# Patient Record
Sex: FEMALE | Race: WHITE | NOT HISPANIC OR LATINO
[De-identification: names, ages, dates, MRNs, and addresses within clinical notes are randomized per-mention and may not be internally consistent; named-entity substitution may affect disease eponyms.]

---

## 2021-07-11 DIAGNOSIS — E78.5 HYPERLIPIDEMIA, UNSPECIFIED: ICD-10-CM

## 2021-07-11 DIAGNOSIS — I10 ESSENTIAL (PRIMARY) HYPERTENSION: ICD-10-CM

## 2021-07-11 DIAGNOSIS — Z80.3 FAMILY HISTORY OF MALIGNANT NEOPLASM OF BREAST: ICD-10-CM

## 2021-07-11 DIAGNOSIS — Z78.9 OTHER SPECIFIED HEALTH STATUS: ICD-10-CM

## 2021-07-11 DIAGNOSIS — Z86.73 PERSONAL HISTORY OF TRANSIENT ISCHEMIC ATTACK (TIA), AND CEREBRAL INFARCTION W/OUT RESIDUAL DEFICITS: ICD-10-CM

## 2021-07-12 ENCOUNTER — APPOINTMENT (OUTPATIENT)
Dept: NEUROLOGY | Facility: CLINIC | Age: 86
End: 2021-07-12
Payer: MEDICARE

## 2021-07-12 VITALS
WEIGHT: 135 LBS | DIASTOLIC BLOOD PRESSURE: 68 MMHG | HEIGHT: 58 IN | HEART RATE: 78 BPM | BODY MASS INDEX: 28.34 KG/M2 | SYSTOLIC BLOOD PRESSURE: 139 MMHG

## 2021-07-12 DIAGNOSIS — K52.9 NONINFECTIVE GASTROENTERITIS AND COLITIS, UNSPECIFIED: ICD-10-CM

## 2021-07-12 PROCEDURE — 99213 OFFICE O/P EST LOW 20 MIN: CPT

## 2021-07-12 NOTE — CONSULT LETTER
[Dear  ___] : Dear  [unfilled], [Consult Letter:] : I had the pleasure of evaluating your patient, [unfilled]. [Please see my note below.] : Please see my note below. [Consult Closing:] : Thank you very much for allowing me to participate in the care of this patient.  If you have any questions, please do not hesitate to contact me. [Sincerely,] : Sincerely, [FreeTextEntry3] : Jc Fong MD\par  [DrJp  ___] : Dr. NIETO

## 2021-07-12 NOTE — PHYSICAL EXAM
[FreeTextEntry1] : Constitutional:  Patient was elderly and frail but in no acute distress. \par \par Head:  Normocephalic, atraumatic. Tympanic membranes were not visualized. \par \par Neck:  Supple with full range of motion.  There was mild upper thoracic tenderness.\par \par Cardiovascular:  Cardiac rhythm was regular without murmur. There were no carotid bruits.  There was bilateral pretibial and ankle edema with absent pulses at the knees and ankles.  There was acrocyanosis of the feet left more than right.  There were plantar sores on the first metatarsal phalangeal joints.\par \par Respiratory:  Lungs were clear. \par \par Abdomen:  Soft and nontender. \par \par Spine: There was mild lumbar tenderness.\par \par Skin:  There were no rashes. \par \par NEUROLOGICAL EXAMINATION:\par \par Mental Status: Patient was alert and oriented. Speech was fluent. There was no dysarthria. \par \par Cranial Nerves: \par \par II: She could finger count bilaterally.  Pupils were surgical but reactive. Visual fields were full. Funduscopic examination was normal. \par \par III, IV, VI:  Eye movements were full without nystagmus. \par \par V: Facial sensation was intact. \par \par VII: Facial strength was normal. \par \par VIII: Hearing was diminished bilaterally. \par \par IX, X: Palatal movement was normal. Phonation was normal. \par \par XI: Sternocleidomastoids and trapezii were normal. \par \par XII: Tongue was midline and movements normal. There was no lingual atrophy or fasciculations. \par \par Motor Examination: Muscle bulk, tone and strength were normal except for difficulty abducting her toes. \par \par Sensory Examination: Vibration sense was diminished in the feet.  Joint position sense was intact.  There was shading of pinprick to the knees and palms.\par \par Reflexes: DTRs were trace at the biceps and left ankle, 2 at the knees and absent elsewhere.\par \par Plantar Responses: Plantar responses were flexor. \par \par Coordination/Cerebellar Function: There was no dysmetria on finger to nose or heel to shin testing. \par \par Gait/Stance: Posture was stooped.  Strides were short and gait very unsteady.\par

## 2021-07-12 NOTE — HISTORY OF PRESENT ILLNESS
[FreeTextEntry1] : Mrs. Felicitas Ny returned to the office having been last seen on May 26, 2020 by telemedicine.  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  She was undergoing physical therapy which at least temporarily alleviated her low back pain.  She complains of painful feet, toes and soles.  Her legs were swollen.  She is on gabapentin 300 mg 3 times daily and duloxetine 60 mg daily.\par \par Mrs. Ny complains of recent fatigue.  She did not contract COVID-19 but received the Pfizer vaccine.  She complains of mild neck discomfort and more severe low back pain.  She is still plagued with daily diarrhea.  She was previously evaluated by Dr. Daron Hernandez and possibly treated with cholestyramine.  She complains of an overactive bladder with incontinence.  She has painful feet with sores under the balls of her feet.  She complains of finger cramping and difficulty knitting.\par \par Medications include gabapentin 300 mg twice a day, losartan HCT, rosuvastatin, duloxetine 60 mg daily, aspirin, vitamin C, magnesium and furosemide.

## 2021-07-12 NOTE — REVIEW OF SYSTEMS
[Feeling Tired] : feeling tired [Hand Weakness] :  hand weakness [Abnormal Sensation] : an abnormal sensation [Difficulty Walking] : difficulty walking [Incontinence] : incontinence [Negative] : Heme/Lymph

## 2021-07-12 NOTE — ASSESSMENT
[FreeTextEntry1] : Mrs. 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  She presents with worsening fatigue, low back pain and bilateral lower extremity claudication.  Although possibly on the basis of lumbar canal stenosis, the findings of plantar sores, acrocyanosis and absent pedal and popliteal pulses should prompt exclusion of vascular claudication.\par \par I suggested that she undergo PVR studies of the lower extremities.  She will return to the office for EMG and nerve conduction studies.  She will consult with you and Dr. Hernandez regarding management of her chronic diarrhea.  Further management will depend upon these results and her clinical course.

## 2021-07-20 ENCOUNTER — NON-APPOINTMENT (OUTPATIENT)
Age: 86
End: 2021-07-20

## 2021-08-15 ENCOUNTER — INPATIENT (INPATIENT)
Facility: HOSPITAL | Age: 86
LOS: 2 days | Discharge: NURSING, REHAB OR LONG-TERM CARE | DRG: 87 | End: 2021-08-18
Attending: INTERNAL MEDICINE | Admitting: INTERNAL MEDICINE
Payer: MEDICARE

## 2021-08-15 VITALS
WEIGHT: 160.06 LBS | HEIGHT: 58 IN | DIASTOLIC BLOOD PRESSURE: 89 MMHG | TEMPERATURE: 98 F | HEART RATE: 96 BPM | SYSTOLIC BLOOD PRESSURE: 170 MMHG | RESPIRATION RATE: 17 BRPM

## 2021-08-15 DIAGNOSIS — S06.5X9A TRAUMATIC SUBDURAL HEMORRHAGE WITH LOSS OF CONSCIOUSNESS OF UNSPECIFIED DURATION, INITIAL ENCOUNTER: ICD-10-CM

## 2021-08-15 DIAGNOSIS — Z96.652 PRESENCE OF LEFT ARTIFICIAL KNEE JOINT: Chronic | ICD-10-CM

## 2021-08-15 LAB
ALBUMIN SERPL ELPH-MCNC: 3.9 G/DL — SIGNIFICANT CHANGE UP (ref 3.3–5)
ALP SERPL-CCNC: 82 U/L — SIGNIFICANT CHANGE UP (ref 40–120)
ALT FLD-CCNC: 30 U/L — SIGNIFICANT CHANGE UP (ref 10–45)
ANION GAP SERPL CALC-SCNC: 10 MMOL/L — SIGNIFICANT CHANGE UP (ref 5–17)
ANION GAP SERPL CALC-SCNC: 10 MMOL/L — SIGNIFICANT CHANGE UP (ref 5–17)
AST SERPL-CCNC: 43 U/L — HIGH (ref 10–40)
BASOPHILS # BLD AUTO: 0.03 K/UL — SIGNIFICANT CHANGE UP (ref 0–0.2)
BASOPHILS NFR BLD AUTO: 0.3 % — SIGNIFICANT CHANGE UP (ref 0–2)
BILIRUB SERPL-MCNC: 0.4 MG/DL — SIGNIFICANT CHANGE UP (ref 0.2–1.2)
BLD GP AB SCN SERPL QL: NEGATIVE — SIGNIFICANT CHANGE UP
BUN SERPL-MCNC: 18 MG/DL — SIGNIFICANT CHANGE UP (ref 7–23)
BUN SERPL-MCNC: 20 MG/DL — SIGNIFICANT CHANGE UP (ref 7–23)
CALCIUM SERPL-MCNC: 9.2 MG/DL — SIGNIFICANT CHANGE UP (ref 8.4–10.5)
CALCIUM SERPL-MCNC: 9.6 MG/DL — SIGNIFICANT CHANGE UP (ref 8.4–10.5)
CHLORIDE SERPL-SCNC: 103 MMOL/L — SIGNIFICANT CHANGE UP (ref 96–108)
CHLORIDE SERPL-SCNC: 104 MMOL/L — SIGNIFICANT CHANGE UP (ref 96–108)
CO2 SERPL-SCNC: 25 MMOL/L — SIGNIFICANT CHANGE UP (ref 22–31)
CO2 SERPL-SCNC: 28 MMOL/L — SIGNIFICANT CHANGE UP (ref 22–31)
CREAT SERPL-MCNC: 0.86 MG/DL — SIGNIFICANT CHANGE UP (ref 0.5–1.3)
CREAT SERPL-MCNC: 0.89 MG/DL — SIGNIFICANT CHANGE UP (ref 0.5–1.3)
EOSINOPHIL # BLD AUTO: 0.07 K/UL — SIGNIFICANT CHANGE UP (ref 0–0.5)
EOSINOPHIL NFR BLD AUTO: 0.7 % — SIGNIFICANT CHANGE UP (ref 0–6)
GLUCOSE SERPL-MCNC: 114 MG/DL — HIGH (ref 70–99)
GLUCOSE SERPL-MCNC: 123 MG/DL — HIGH (ref 70–99)
HCT VFR BLD CALC: 49 % — HIGH (ref 34.5–45)
HGB BLD-MCNC: 15.8 G/DL — HIGH (ref 11.5–15.5)
IMM GRANULOCYTES NFR BLD AUTO: 0.4 % — SIGNIFICANT CHANGE UP (ref 0–1.5)
LYMPHOCYTES # BLD AUTO: 0.64 K/UL — LOW (ref 1–3.3)
LYMPHOCYTES # BLD AUTO: 6.7 % — LOW (ref 13–44)
MCHC RBC-ENTMCNC: 32.2 GM/DL — SIGNIFICANT CHANGE UP (ref 32–36)
MCHC RBC-ENTMCNC: 32.6 PG — SIGNIFICANT CHANGE UP (ref 27–34)
MCV RBC AUTO: 101 FL — HIGH (ref 80–100)
MONOCYTES # BLD AUTO: 0.55 K/UL — SIGNIFICANT CHANGE UP (ref 0–0.9)
MONOCYTES NFR BLD AUTO: 5.7 % — SIGNIFICANT CHANGE UP (ref 2–14)
NEUTROPHILS # BLD AUTO: 8.26 K/UL — HIGH (ref 1.8–7.4)
NEUTROPHILS NFR BLD AUTO: 86.2 % — HIGH (ref 43–77)
NRBC # BLD: 0 /100 WBCS — SIGNIFICANT CHANGE UP (ref 0–0)
PLATELET # BLD AUTO: 188 K/UL — SIGNIFICANT CHANGE UP (ref 150–400)
POTASSIUM SERPL-MCNC: 4.1 MMOL/L — SIGNIFICANT CHANGE UP (ref 3.5–5.3)
POTASSIUM SERPL-MCNC: 5.9 MMOL/L — HIGH (ref 3.5–5.3)
POTASSIUM SERPL-SCNC: 4.1 MMOL/L — SIGNIFICANT CHANGE UP (ref 3.5–5.3)
POTASSIUM SERPL-SCNC: 5.9 MMOL/L — HIGH (ref 3.5–5.3)
PROT SERPL-MCNC: 7.1 G/DL — SIGNIFICANT CHANGE UP (ref 6–8.3)
RBC # BLD: 4.85 M/UL — SIGNIFICANT CHANGE UP (ref 3.8–5.2)
RBC # FLD: 12.6 % — SIGNIFICANT CHANGE UP (ref 10.3–14.5)
RH IG SCN BLD-IMP: POSITIVE — SIGNIFICANT CHANGE UP
SARS-COV-2 RNA SPEC QL NAA+PROBE: SIGNIFICANT CHANGE UP
SODIUM SERPL-SCNC: 138 MMOL/L — SIGNIFICANT CHANGE UP (ref 135–145)
SODIUM SERPL-SCNC: 142 MMOL/L — SIGNIFICANT CHANGE UP (ref 135–145)
WBC # BLD: 9.59 K/UL — SIGNIFICANT CHANGE UP (ref 3.8–10.5)
WBC # FLD AUTO: 9.59 K/UL — SIGNIFICANT CHANGE UP (ref 3.8–10.5)

## 2021-08-15 PROCEDURE — 71045 X-RAY EXAM CHEST 1 VIEW: CPT | Mod: 26

## 2021-08-15 PROCEDURE — 72170 X-RAY EXAM OF PELVIS: CPT | Mod: 26

## 2021-08-15 PROCEDURE — 70450 CT HEAD/BRAIN W/O DYE: CPT | Mod: 26,MH

## 2021-08-15 PROCEDURE — 72125 CT NECK SPINE W/O DYE: CPT | Mod: 26,MH

## 2021-08-15 PROCEDURE — G1004: CPT

## 2021-08-15 PROCEDURE — 99285 EMERGENCY DEPT VISIT HI MDM: CPT

## 2021-08-15 PROCEDURE — 70450 CT HEAD/BRAIN W/O DYE: CPT | Mod: 26,MG,77

## 2021-08-15 RX ORDER — LEVETIRACETAM 250 MG/1
500 TABLET, FILM COATED ORAL ONCE
Refills: 0 | Status: COMPLETED | OUTPATIENT
Start: 2021-08-15 | End: 2021-08-15

## 2021-08-15 RX ORDER — MIRABEGRON 50 MG/1
1 TABLET, EXTENDED RELEASE ORAL
Qty: 0 | Refills: 0 | DISCHARGE

## 2021-08-15 RX ORDER — SODIUM CHLORIDE 9 MG/ML
1000 INJECTION, SOLUTION INTRAVENOUS
Refills: 0 | Status: DISCONTINUED | OUTPATIENT
Start: 2021-08-15 | End: 2021-08-18

## 2021-08-15 RX ORDER — LOSARTAN POTASSIUM 100 MG/1
1 TABLET, FILM COATED ORAL
Qty: 0 | Refills: 0 | DISCHARGE

## 2021-08-15 RX ORDER — THIAMINE MONONITRATE (VIT B1) 100 MG
1 TABLET ORAL
Qty: 0 | Refills: 0 | DISCHARGE

## 2021-08-15 RX ORDER — ASPIRIN/CALCIUM CARB/MAGNESIUM 324 MG
1 TABLET ORAL
Qty: 0 | Refills: 0 | DISCHARGE

## 2021-08-15 RX ORDER — GABAPENTIN 400 MG/1
1 CAPSULE ORAL
Qty: 0 | Refills: 0 | DISCHARGE

## 2021-08-15 RX ORDER — DICLOFENAC SODIUM 30 MG/G
0 GEL TOPICAL
Qty: 0 | Refills: 0 | DISCHARGE

## 2021-08-15 RX ORDER — DENOSUMAB 60 MG/ML
0 INJECTION SUBCUTANEOUS
Qty: 0 | Refills: 0 | DISCHARGE

## 2021-08-15 RX ORDER — ROSUVASTATIN CALCIUM 5 MG/1
1 TABLET ORAL
Qty: 0 | Refills: 0 | DISCHARGE

## 2021-08-15 RX ORDER — FUROSEMIDE 40 MG
1 TABLET ORAL
Qty: 0 | Refills: 0 | DISCHARGE

## 2021-08-15 RX ORDER — DULOXETINE HYDROCHLORIDE 30 MG/1
2 CAPSULE, DELAYED RELEASE ORAL
Qty: 0 | Refills: 0 | DISCHARGE

## 2021-08-15 RX ORDER — TETANUS TOXOID, REDUCED DIPHTHERIA TOXOID AND ACELLULAR PERTUSSIS VACCINE, ADSORBED 5; 2.5; 8; 8; 2.5 [IU]/.5ML; [IU]/.5ML; UG/.5ML; UG/.5ML; UG/.5ML
0.5 SUSPENSION INTRAMUSCULAR ONCE
Refills: 0 | Status: COMPLETED | OUTPATIENT
Start: 2021-08-15 | End: 2021-08-15

## 2021-08-15 RX ORDER — ACETAMINOPHEN 500 MG
650 TABLET ORAL ONCE
Refills: 0 | Status: COMPLETED | OUTPATIENT
Start: 2021-08-15 | End: 2021-08-15

## 2021-08-15 RX ADMIN — TETANUS TOXOID, REDUCED DIPHTHERIA TOXOID AND ACELLULAR PERTUSSIS VACCINE, ADSORBED 0.5 MILLILITER(S): 5; 2.5; 8; 8; 2.5 SUSPENSION INTRAMUSCULAR at 12:14

## 2021-08-15 RX ADMIN — Medication 650 MILLIGRAM(S): at 11:57

## 2021-08-15 RX ADMIN — Medication 650 MILLIGRAM(S): at 12:27

## 2021-08-15 RX ADMIN — LEVETIRACETAM 400 MILLIGRAM(S): 250 TABLET, FILM COATED ORAL at 16:34

## 2021-08-15 RX ADMIN — SODIUM CHLORIDE 50 MILLILITER(S): 9 INJECTION, SOLUTION INTRAVENOUS at 21:32

## 2021-08-15 NOTE — ED PROVIDER NOTE - NSICDXPASTMEDICALHX_GEN_ALL_CORE_FT
PAST MEDICAL HISTORY:  Arthroplasty of the Knee     CVA (Cerebral Vascular Accident)     Hypertension

## 2021-08-15 NOTE — CONSULT NOTE ADULT - ASSESSMENT
SILVERIO MCGILL  89 F hx TIA/HTN on ASA81 p/f fall. Hit back of head w/o LOC. CT: 2x1.5cm R parafalcine SDH w/o MLS. Exam: AOx3, PERRL, EOMI, no facial, no drift, LOU 5/5, SILT.  - No acute neurosurgical intervention  - Keppra 500 BID for 7 days  - Repeat CTH in 4h to r/o enlarging SDH  - Outpatient f/u after discharge  - Will discuss with neurosurgery attending  - Plan discussed with ER   SILVERIO MCGILL  89 F hx TIA/HTN on ASA81 p/f fall. Hit back of head w/o LOC. CT: 2x1.5mm R parafalcine SDH w/o MLS. Exam: AOx3, PERRL, EOMI, no facial, no drift, LOU 5/5, SILT.  - No acute neurosurgical intervention  - Keppra 500 BID for 7 days  - Repeat CTH in 4h to r/o enlarging SDH  - Outpatient f/u after discharge  - Will discuss with neurosurgery attending  - Plan discussed with ER

## 2021-08-15 NOTE — ED ADULT NURSE NOTE - OBJECTIVE STATEMENT
Pt presents to ED via EMS in [place] for fall at home, AXOX3, PERRL, pt reports falling in bathroom after losing balance, pt denies LOC, pt reports head injury, pt on ASA, no other AC use reported.  Pt was attended to immediately following the fall by , EMS was called at that time, Pt reports pain  to posterior aspect of head, tender on palpation, reports pain at midline of cervical spine on palpation. Pt denies chest pain or shortness of breath, breathing unlabored, symmetrical, abdomen soft and nontender, pt reports mild nausea, no vomiting, diarrhea, no fevers or chills. +2 edema noted bilaterally. Skin tear approx 2cm noted to R elbow, dressed on arrival.

## 2021-08-15 NOTE — ED PROVIDER NOTE - NS ED ROS FT
GENERAL: No fever or chills  EYES: no change in vision  HEENT: no trouble swallowing or speaking  CARDIAC: no chest pain or palpitations   PULMONARY: no cough or SOB  GI: no abdominal pain, vomiting, diarrhea, or constipation   : No changes in urination  SKIN: see hpi  NEURO: no headache, numbness, or weakness.  MSK: see hpi

## 2021-08-15 NOTE — H&P ADULT - ASSESSMENT
89 F with pmhx cad, htn s/op fall today.   Patient states that she walks with walker, tripped and fell, no loss of conscious ness. Head injury present.     ROS other wise negative.

## 2021-08-15 NOTE — ED PROVIDER NOTE - CLINICAL SUMMARY MEDICAL DECISION MAKING FREE TEXT BOX
89 yo F with no pmh on Aspirin coming from home after trip and fall today. VS WNL. Left 8x6cm scalp hematoma. C6-7 spine ttp. Minor skin desquamation on the left arm. Head c-spine CT, CXR, pelvis XR, Tdap, reassess. 89 yo F with no pmh on Aspirin coming from home after trip and fall today. VS WNL. Left 8x6cm scalp hematoma. C6-7 spine ttp. Minor skin desquamation on the left arm. Head c-spine CT, CXR, pelvis XR, Tdap, reassess. ZR

## 2021-08-15 NOTE — ED ADULT NURSE REASSESSMENT NOTE - NS ED NURSE REASSESS COMMENT FT1
Report received from Edgar OJEDA RN. Pt AAOx4, NAD, resp nonlabored, skin warm/dry, resting comfortably in bed with call bell at bedside. Pt denies headache, dizziness, chest pain, palpitations, SOB, abd pain, n/v/d, urinary symptoms, fevers, chills at this time. Pt awaiting bed. Safety maintained.

## 2021-08-15 NOTE — ED PROVIDER NOTE - NSICDXFAMILYHX_GEN_ALL_CORE_FT
FAMILY HISTORY:  Mother  Still living? No  Family history of coronary artery disease, Age at diagnosis:

## 2021-08-15 NOTE — CONSULT NOTE ADULT - SUBJECTIVE AND OBJECTIVE BOX
p ([id number])     HPI:  89 yo F with no pmh on Aspirin coming from home after trip and fall today. Reports tripping and falling in the restroom. Reports hitting the back of her head but no LOC. Pt called her  who helped her get up and called EMS. Also with abrasion to r arm. Reports neck pain and nausea but no vomiting, visual deficits or chest pain / sob prior to fall. No weakness.    Imaging:  Head CT: 1.9 x 1.5 cm x 0.5 cm thick right interhemisphere small subdural hemorrhage adjacent to the upper frontal lobe falx. No midline shift.  Old lacunar infarct left basal ganglia extending into the left corona radiata.    Exam:  AOx3, PERRL, EOMI, no facial, no drift, LOU 5/5, SILT.     --Anticoagulation:    =====================  PAST MEDICAL HISTORY   Hypertension    CVA (Cerebral Vascular Accident)    Arthroplasty of the Knee      PAST SURGICAL HISTORY   History of total knee arthroplasty, left      penicillin (Rash)      MEDICATIONS:  Antibiotics:    Neuro:    Other:      SOCIAL HISTORY:   Occupation:   Marital Status:     FAMILY HISTORY:  Family history of coronary artery disease (Mother)        ROS: Negative except per HPI    LABS:                          15.8   9.59  )-----------( 188      ( 15 Aug 2021 13:37 )             49.0     08-15    138  |  103  |  20  ----------------------------<  123<H>  5.9<H>   |  25  |  0.86    Ca    9.6      15 Aug 2021 13:37    TPro  7.1  /  Alb  3.9  /  TBili  0.4  /  DBili  x   /  AST  43<H>  /  ALT  30  /  AlkPhos  82  08-15       p ([id number])     HPI:  89 yo F with no pmh on Aspirin coming from home after trip and fall today. Reports tripping and falling in the restroom. Reports hitting the back of her head but no LOC. Pt called her  who helped her get up and called EMS. Also with abrasion to r arm. Reports neck pain and nausea but no vomiting, visual deficits or chest pain / sob prior to fall. No weakness.    Imaging:  Head CT: 1.9 x 1.5 mm thick right interhemisphere small subdural hemorrhage adjacent to the upper frontal lobe falx. No midline shift.  Old lacunar infarct left basal ganglia extending into the left corona radiata.    Exam:  AOx3, PERRL, EOMI, no facial, no drift, LOU 5/5, SILT.     --Anticoagulation:    =====================  PAST MEDICAL HISTORY   Hypertension    CVA (Cerebral Vascular Accident)    Arthroplasty of the Knee      PAST SURGICAL HISTORY   History of total knee arthroplasty, left      penicillin (Rash)      MEDICATIONS:  Antibiotics:    Neuro:    Other:      SOCIAL HISTORY:   Occupation:   Marital Status:     FAMILY HISTORY:  Family history of coronary artery disease (Mother)        ROS: Negative except per HPI    LABS:                          15.8   9.59  )-----------( 188      ( 15 Aug 2021 13:37 )             49.0     08-15    138  |  103  |  20  ----------------------------<  123<H>  5.9<H>   |  25  |  0.86    Ca    9.6      15 Aug 2021 13:37    TPro  7.1  /  Alb  3.9  /  TBili  0.4  /  DBili  x   /  AST  43<H>  /  ALT  30  /  AlkPhos  82  08-15

## 2021-08-15 NOTE — ED PROVIDER NOTE - OBJECTIVE STATEMENT
89 yo F with no pmh on Aspirin coming from home after trip and fall today. Reports tripping and falling in the restroom. Reports hitting the back of her head but no LOC. 89 yo F with no pmh on Aspirin coming from home after trip and fall today. Reports tripping and falling in the restroom. Reports hitting the back of her head but no LOC. Pt called her  who helped her get up and called EMS. Also with abrasion to r arm. Reports neck pain and nausea but no vomiting, visual deficits or chest pain / sob prior to fall. No weakness.

## 2021-08-15 NOTE — ED ADULT TRIAGE NOTE - CHIEF COMPLAINT QUOTE
trip in the bathroom, fell backwards, hitting back of head and R elbow. denies LOC. not on a/c. c collar in place. denies pain

## 2021-08-15 NOTE — ED PROVIDER NOTE - PHYSICAL EXAMINATION
Gen: AAOx3, non-toxic  Head: Left 8x6cm scalp hematoma. C6-7 spine ttp.   HEENT: EOMI, oral mucosa moist, normal conjunctiva  Lung: CTAB, no respiratory distress, no wheezes/rhonchi/rales B/L, speaking in full sentences  CV: RRR, no murmurs, rubs or gallops  Abd: soft, NTND, no guarding, no CVA tenderness  MSK: no visible deformities  Neuro: No focal sensory or motor deficits, normal CN exam   Skin: 2x3cm desquamation on the left lateral arm.   Psych: normal affect.

## 2021-08-15 NOTE — ED PROVIDER NOTE - PROGRESS NOTE DETAILS
CT with small Right SAH,  no midline shift. Neurosurgery consulted and aware of the patient. CT with small Right SDH, no midline shift. Neurosurgery consulted and aware of the patient. Rpt CT stable, cleared by NSG. C-spine cleared as well. Patient, however, unable to safety ambulate and only lives her 89yo .

## 2021-08-16 LAB
COVID-19 SPIKE DOMAIN AB INTERP: POSITIVE
COVID-19 SPIKE DOMAIN ANTIBODY RESULT: >250 U/ML — HIGH
SARS-COV-2 IGG+IGM SERPL QL IA: >250 U/ML — HIGH
SARS-COV-2 IGG+IGM SERPL QL IA: POSITIVE

## 2021-08-16 PROCEDURE — 70450 CT HEAD/BRAIN W/O DYE: CPT | Mod: 26

## 2021-08-16 PROCEDURE — 99221 1ST HOSP IP/OBS SF/LOW 40: CPT

## 2021-08-16 RX ORDER — ACETAMINOPHEN 500 MG
1000 TABLET ORAL ONCE
Refills: 0 | Status: COMPLETED | OUTPATIENT
Start: 2021-08-16 | End: 2021-08-16

## 2021-08-16 RX ORDER — LEVETIRACETAM 250 MG/1
500 TABLET, FILM COATED ORAL
Refills: 0 | Status: DISCONTINUED | OUTPATIENT
Start: 2021-08-16 | End: 2021-08-18

## 2021-08-16 RX ORDER — ACETAMINOPHEN 500 MG
650 TABLET ORAL EVERY 6 HOURS
Refills: 0 | Status: DISCONTINUED | OUTPATIENT
Start: 2021-08-16 | End: 2021-08-16

## 2021-08-16 RX ADMIN — Medication 400 MILLIGRAM(S): at 08:00

## 2021-08-16 RX ADMIN — LEVETIRACETAM 500 MILLIGRAM(S): 250 TABLET, FILM COATED ORAL at 05:10

## 2021-08-16 RX ADMIN — LEVETIRACETAM 500 MILLIGRAM(S): 250 TABLET, FILM COATED ORAL at 17:55

## 2021-08-16 NOTE — PATIENT PROFILE ADULT - NSASFALLNEEDSASSIST_GEN_A_NUR
Controlled.  Pt is currently stable on medication regimen.  Continue current therapy as scheduled.  Contact office with any questions about adjustments on medications.      yes

## 2021-08-17 RX ADMIN — LEVETIRACETAM 500 MILLIGRAM(S): 250 TABLET, FILM COATED ORAL at 17:04

## 2021-08-17 RX ADMIN — LEVETIRACETAM 500 MILLIGRAM(S): 250 TABLET, FILM COATED ORAL at 05:25

## 2021-08-17 NOTE — CONSULT NOTE ADULT - SUBJECTIVE AND OBJECTIVE BOX
[facility]        Patient is a 89y old  Female who presents with a chief complaint of s/p fall today (17 Aug 2021 13:03)    Excerpt from H&P,"     89 F with pmhx cad, htn s/op fall today.   Patient states that she walks with walker, tripped and fell, no loss of conscious ness. Head injury present.     ROS other wise negative.    (15 Aug 2021 19:49)           *****PAST MEDICAL / Surgical  HISTORY:  PAST MEDICAL & SURGICAL HISTORY:  Hypertension    CVA (Cerebral Vascular Accident)    Arthroplasty of the Knee    History of total knee arthroplasty, left             *****FAMILY HISTORY:  FAMILY HISTORY:  Family history of coronary artery disease (Mother)             *****SOCIAL HISTORY:  Alcohol: None  Smoking: None         *****ALLERGIES:   Allergies    penicillin (Rash)    Intolerances             *****MEDICATIONS: current medication reviewed and documented.   MEDICATIONS  (STANDING):  lactated ringers. 1000 milliLiter(s) (50 mL/Hr) IV Continuous <Continuous>  levETIRAcetam 500 milliGRAM(s) Oral two times a day    MEDICATIONS  (PRN):           *****REVIEW OF SYSTEM:  GEN: no fever, no chills, no pain  RESP: no SOB, no cough, no sputum  CVS: no chest pain, no palpitations, no edema  GI: no abdominal pain, no nausea, no vomiting, no constipation, no diarrhea  : no dysurea, no frequency, no hematurea  Neuro: no headache, no dizziness  PSYCH: no anxiety, no depression  Derm : no itching, no rash         *****VITAL SIGNS:  T(C): 36.8 (08-17-21 @ 16:02), Max: 36.8 (08-17-21 @ 16:02)  HR: 77 (08-17-21 @ 16:02) (71 - 86)  BP: 155/70 (08-17-21 @ 16:02) (126/69 - 171/70)  RR: 18 (08-17-21 @ 16:02) (17 - 18)  SpO2: 96% (08-17-21 @ 16:02) (93% - 96%)  Wt(kg): --    08-16 @ 07:01  -  08-17 @ 07:00  --------------------------------------------------------  IN: 240 mL / OUT: 600 mL / NET: -360 mL    08-17 @ 07:01  -  08-17 @ 18:14  --------------------------------------------------------  IN: 480 mL / OUT: 300 mL / NET: 180 mL             *****PHYSICAL EXAM:   Alert oriented x 3   Attention comprehension are fair. Able to name, repeat, read without any difficulty.   Able to follow 3 step commands.     EOMI fundi not visualized,  VFF to confrontration  No facial asymmetry   Tongue is midline   Palate elevates symmetrically   Moving all 4 ext symmetrically no pronator drift   Reflexes are symmetric throughout   sensation is grossly symmetric  Gait : not assessed.  B/L down going toes               *****LAB AND IMAGING:                                                  [All pertinent recent Imaging reports reviewed]         *****A S S E S S M E N T   A N D   P L A N :     Excerpt from H&P,"     89 F with pmhx cad, htn s/op fall today.   Patient states that she walks with walker, tripped and fell, no loss of conscious ness. Head injury present.               Problem/Recommendations 1:  sdh falcine   unchanged  denies headache   continue current management       Problem/Recommendations 2:htn elevated   bp goal < 140        ___________________________  Will follow with you.  Thank you,  Marina Thayer MD  Diplomate of the American Board of Neurology and Psychiatry.  Diplomate of the American Board of Vascular Neurology.   [facility]   Ph: [phone]    Differential diagnosis and plan of care discussed with patient after the evaluation.   Advanced care planning options discussed.   Pain assessed and judicious use of narcotics when appropriate was discussed.  Importance of Fall prevention discussed.  Counseling on Smoking and Alcohol cessation was offered when appropriate.  Counseling on Diet, exercise, and medication compliance was done.   83 minutes spent on the total encounter;  more than 50 % of the visit was spent on counseling  and or coordinating care by the attending physician.    Thank you for allowing me to participate in the care of this tamir patient. Please do not hesitate to call me if you have any questions.     This and subsequent notes  will  inherently be subject to errors including those of syntax and substitutions which may escape proofreading. In such instances original meaning may be extrapolated by contextual derivation.

## 2021-08-17 NOTE — PHYSICAL THERAPY INITIAL EVALUATION ADULT - ADDITIONAL COMMENTS
as per pt, resides in an longterm, PTA, pt amb (I0 with rollator for 150ft, required assist for some ADls.

## 2021-08-17 NOTE — PHYSICAL THERAPY INITIAL EVALUATION ADULT - PERTINENT HX OF CURRENT PROBLEM, REHAB EVAL
89 F with pmhx cad, htn s/op fall today. no LOC, +head injury, found Rt SDH, CT HEad/neck, 8/16, Old Lt basal ganglia infarct, Rt SDH. CXR (-). XR Pelvis (-).

## 2021-08-17 NOTE — PHYSICAL THERAPY INITIAL EVALUATION ADULT - LEVEL OF INDEPENDENCE, REHAB EVAL
needle was used to identify the vein. A guide wire was then inserted into the vein through the needle. A triple lumen catheter was then inserted into the vessel over the guide wire using the Seldinger technique. All ports showed good, free flowing blood return and were flushed with saline solution. The catheter was then securely fastened to the skin with suture at 20 cm. Two sutures were placed into the kit included tube clamp. An antibiotic disk was placed and the site was then covered with a sterile dressing. A post procedure X-ray was not indicated. The patient tolerated the procedure well. Complications: None        MDM  Number of Diagnoses or Management Options  Diagnosis management comments: Patient presented today acutely ill from nursing home. Patient was hypotensive and tachycardic upon arrival, despite 3 L of saline bolus, patient was still hypotensive, therefore central line was placed and patient was started on Levophed. Respiratory rate is pan negative, including for COVID-19. CT of the abdomen demonstrates bilateral pneumonia, however no acute abdominal pathology noted. Urinalysis is consistent with urinary tract infection, patient does have a leukocytosis of 24.7, creatinine is 2.6, GFR 23, BUN 63. Patient also has transaminitis. Patient admitted to the ICU at this time. ED Course as of Nov 19 2317   Thu Nov 19, 2020   2946 ATTENDING PROVIDER ATTESTATION:     I have personally performed and/or participated in the history, exam, medical decision making, and procedures and agree with all pertinent clinical information unless otherwise noted. I have also reviewed and agree with the past medical, family and social history unless otherwise noted. I have discussed this patient in detail with the resident, and provided the instruction and education regarding patient here for apparent syncopal event, he is at his neurologic baseline with dementia.   Is at a nursing home recently treated for UTI and apparently the staff had him on the toilet use the bathroom today when he passed out while on the toilet with no fall or injury. Patient upon arrival offers no verbal responses, no chief complaint, history present as or review of systems from the patient himself. .  My findings/plan: Patient appears generally cachectic and ill, he is a DNR CCA. He is tachycardic. Lungs are clear anteriorly. Abdomen soft and nontender with no distention. He has no jaundice or icterus. No pretibial edema or expression of pain during palpation of his extremities. Overall appears ill, nursing staff immediately starts 2 peripheral IVs, 14 and 18-gauge angiocatheters. IV fluids started and septic work-up begun. He is sinus tachycardia on the monitor and on EKG. [NC]   2102 I called and spoke to patient's son and daughter-in-law, had an extensive conversation with mom regarding patient's condition and prognosis. Discussed CODE STATUS and they state that they would like patient to remain DNR CCA. [DS]   2216 CRITICAL CARE:   55 MINUTES. Please note that the withdrawal or failure to initiate urgent interventions for this patient would likely result in a life threatening deterioration or permanent disability. Accordingly this patient received the above mentioned time, excluding separately billable procedures. [NC]      ED Course User Index  [DS] [facility]   [NC] Payam Flores DO      ED Course as of Nov 19 2317   u Nov 19, 2020   1851 ATTENDING PROVIDER ATTESTATION:     I have personally performed and/or participated in the history, exam, medical decision making, and procedures and agree with all pertinent clinical information unless otherwise noted. I have also reviewed and agree with the past medical, family and social history unless otherwise noted.     I have discussed this patient in detail with the resident, and provided the instruction and education classified elsewhere, Pneumonia, and Psychosis ([facility] [address] Past Surgical History:  has a past surgical history that includes Kidney removal; Abdomen surgery; Appendectomy; Tonsillectomy; Cholecystectomy; Colonoscopy; and Endoscopy, colon, diagnostic. Social History:  reports that he has quit smoking. He quit after 25.00 years of use. He has never used smokeless tobacco. He reports that he does not drink alcohol or use drugs. Family History: Family history is unknown by patient. The patients home medications have been reviewed. Allergies: Patient has no known allergies.     -------------------------------------------------- RESULTS -------------------------------------------------    LABS:  Results for orders placed or performed during the hospital encounter of 11/19/20   Respiratory Panel, Molecular, with COVID-19 (Restricted: peds pts or suitable admitted adults)    Specimen: Nasopharyngeal   Result Value Ref Range    Adenovirus by PCR Not Detected Not Detected    Bordetella parapertussis by PCR Not Detected Not Detected    Bordetella pertussis by PCR Not Detected Not Detected    Chlamydophilia pneumoniae by PCR Not Detected Not Detected    Coronavirus 229E by PCR Not Detected Not Detected    Coronavirus HKU1 by PCR Not Detected Not Detected    Coronavirus NL63 by PCR Not Detected Not Detected    Coronavirus OC43 by PCR Not Detected Not Detected    SARS-CoV-2, PCR Not Detected Not Detected    Human Metapneumovirus by PCR Not Detected Not Detected    Human Rhinovirus/Enterovirus by PCR Not Detected Not Detected    Influenza A by PCR Not Detected Not Detected    Influenza B by PCR Not Detected Not Detected    Mycoplasma pneumoniae by PCR Not Detected Not Detected    Parainfluenza Virus 1 by PCR Not Detected Not Detected    Parainfluenza Virus 2 by PCR Not Detected Not Detected    Parainfluenza Virus 3 by PCR Not Detected Not Detected    Parainfluenza Virus 4 by PCR Not Detected Not Detected    Respiratory Syncytial Virus by PCR Not Detected Not Detected   Lactate, Sepsis   Result Value Ref Range    Lactic Acid, Sepsis 3.2 (H) 0.5 - 1.9 mmol/L   CBC auto differential   Result Value Ref Range    WBC 24.7 (H) 4.5 - 11.5 E9/L    RBC 3.69 (L) 3.80 - 5.80 E12/L    Hemoglobin 11.6 (L) 12.5 - 16.5 g/dL    Hematocrit 39.1 37.0 - 54.0 %    .0 (H) 80.0 - 99.9 fL    MCH 31.4 26.0 - 35.0 pg    MCHC 29.7 (L) 32.0 - 34.5 %    RDW 15.2 (H) 11.5 - 15.0 fL    Platelets 715 341 - 206 E9/L    MPV 12.2 (H) 7.0 - 12.0 fL    Neutrophils % 90.0 (H) 43.0 - 80.0 %    Lymphocytes % 7.0 (L) 20.0 - 42.0 %    Monocytes % 3.0 2.0 - 12.0 %    Eosinophils % 0.0 0.0 - 6.0 %    Basophils % 0.0 0.0 - 2.0 %    Neutrophils Absolute 22.23 (H) 1.80 - 7.30 E9/L    Lymphocytes Absolute 1.73 1.50 - 4.00 E9/L    Monocytes Absolute 0.74 0.10 - 0.95 E9/L    Eosinophils Absolute 0.00 (L) 0.05 - 0.50 E9/L    Basophils Absolute 0.00 0.00 - 0.20 E9/L    RBC Morphology Normal    Urinalysis   Result Value Ref Range    Color, UA DIANNA (A) Straw/Yellow    Clarity, UA CLOUDY (A) Clear    Glucose, Ur Negative Negative mg/dL    Bilirubin Urine SMALL (A) Negative    Ketones, Urine Negative Negative mg/dL    Specific Gravity, UA 1.010 1.005 - 1.030    Blood, Urine LARGE (A) Negative    pH, UA 8.5 5.0 - 9.0    Protein, UA >=300 (A) Negative mg/dL    Urobilinogen, Urine 2.0 (A) <2.0 E.U./dL    Nitrite, Urine Negative Negative    Leukocyte Esterase, Urine SMALL (A) Negative   APTT   Result Value Ref Range    aPTT 21.9 (L) 24.5 - 35.1 sec   Protime-INR   Result Value Ref Range    Protime 15.9 (H) 9.3 - 12.4 sec    INR 1.4    Comprehensive Metabolic Panel   Result Value Ref Range    Sodium 162 (HH) 132 - 146 mmol/L    Potassium 4.8 3.5 - 5.0 mmol/L    Chloride 126 (HH) 98 - 107 mmol/L    CO2 24 22 - 29 mmol/L    Anion Gap 12 7 - 16 mmol/L    Glucose 188 (H) 74 - 99 mg/dL    BUN 63 (H) 8 - 23 mg/dL    CREATININE 2.6 (H) 0.7 - 1.2 mg/dL    GFR Non-African American 23 >=60 mL/min/1.73    GFR African American 28     Calcium 8.4 (L) 8.6 - 10.2 mg/dL    Total Protein 7.1 6.4 - 8.3 g/dL    Alb 3.1 (L) 3.5 - 5.2 g/dL    Total Bilirubin 0.6 0.0 - 1.2 mg/dL    Alkaline Phosphatase 393 (H) 40 - 129 U/L     (H) 0 - 40 U/L     (H) 0 - 39 U/L   Magnesium   Result Value Ref Range    Magnesium 2.4 1.6 - 2.6 mg/dL   Microscopic Urinalysis   Result Value Ref Range    WBC, UA 0-1 0 - 5 /HPF    RBC, UA >20 0 - 2 /HPF    Bacteria, UA RARE (A) None Seen /HPF    Amorphous, UA MODERATE     Crystals, UA Few (A) None Seen /HPF   EKG 12 lead   Result Value Ref Range    Ventricular Rate 149 BPM    Atrial Rate 143 BPM    P-R Interval 136 ms    QRS Duration 58 ms    Q-T Interval 290 ms    QTc Calculation (Bazett) 456 ms    P Axis 43 degrees    R Axis 45 degrees    T Axis 41 degrees       RADIOLOGY:  CT HEAD WO CONTRAST   Final Result   No acute intracranial abnormality. CT ABDOMEN PELVIS WO CONTRAST Additional Contrast? None   Final Result   No evidence of acute intra-abdominal process, status post cholecystectomy. Sigmoid diverticulosis. Infiltrates at the visualized lung bases. XR CHEST PORTABLE   Final Result   Increased bilateral lung opacities are suspicious for multifocal pneumonia. EKG: This EKG is signed and interpreted by me. Rate: 149  Rhythm: Sinus  Interpretation: Sinus tachycardia, no acute changes noted, no axis deviation,  ms  Comparison: changes compared to previous EKG      ------------------------- NURSING NOTES AND VITALS REVIEWED ---------------------------  Date / Time Roomed:  11/19/2020  6:24 PM  ED Bed Assignment:  01/01    The nursing notes within the ED encounter and vital signs as below have been reviewed.      Patient Vitals for the past 24 hrs:   BP Temp Temp src Pulse Resp SpO2 Weight   11/19/20 2311 98/65 -- -- 112 29 99 % --   11/19/20 2310 -- -- -- 113 29 99 % --   11/19/20 2309 -- -- -- 113 29 99 % --   11/19/20 2308 -- -- -- 113 30 99 % --   11/19/20 2307 -- -- -- 113 (!) 32 99 % --   11/19/20 2306 103/64 -- -- 113 29 99 % --   11/19/20 2305 -- -- -- 113 28 99 % --   11/19/20 2304 -- -- -- 113 (!) 34 99 % --   11/19/20 2303 -- -- -- 113 (!) 36 99 % --   11/19/20 2302 -- -- -- 113 (!) 38 99 % --   11/19/20 2301 99/60 -- -- 114 (!) 31 99 % --   11/19/20 2300 -- -- -- 115 23 99 % --   11/19/20 2259 -- -- -- 117 25 99 % --   11/19/20 2258 (!) 111/55 -- -- 117 19 99 % --   11/19/20 2257 -- -- -- 114 26 99 % --   11/19/20 2256 -- -- -- 116 28 99 % --   11/19/20 2255 -- -- -- 114 22 99 % --   11/19/20 2254 -- -- -- 113 25 99 % --   11/19/20 2253 -- -- -- 113 22 99 % --   11/19/20 2252 (!) 97/55 -- -- 117 25 99 % --   11/19/20 2251 -- -- -- 115 25 99 % --   11/19/20 2250 -- -- -- 114 26 99 % --   11/19/20 2249 (!) 83/59 -- -- 118 29 100 % --   11/19/20 2248 -- -- -- 119 29 99 % --   11/19/20 2247 (!) 59/43 -- -- 119 30 98 % --   11/19/20 2246 -- -- -- 119 30 100 % --   11/19/20 2245 (!) 66/48 -- -- 119 (!) 33 99 % --   11/19/20 2244 -- 101.7 °F (38.7 °C) Bladder -- -- -- --   11/19/20 2242 (!) 72/41 -- -- 121 (!) 32 99 % --   11/19/20 2233 (!) 81/51 -- -- 124 30 99 % --   11/19/20 2228 (!) 70/46 -- -- 123 (!) 31 -- --   11/19/20 2224 -- -- -- 125 (!) 31 99 % --   11/19/20 2212 (!) 93/42 -- -- 130 27 100 % --   11/19/20 2151 94/79 -- -- 143 (!) 40 100 % --   11/19/20 2124 -- -- -- 132 (!) 39 97 % --   11/19/20 2120 -- 100.3 °F (37.9 °C) Bladder -- -- -- --   11/19/20 2108 -- -- -- 119 (!) 35 100 % --   11/19/20 2102 (!) 109/55 -- -- 117 30 100 % --   11/19/20 2052 (!) 80/45 -- -- 120 (!) 35 99 % --   11/19/20 2044 (!) 95/28 -- -- 121 (!) 37 97 % --   11/19/20 2043 -- -- -- 122 28 98 % --   11/19/20 2031 -- -- -- 126 (!) 38 95 % --   11/19/20 2016 94/65 -- -- 134 (!) 47 99 % --   11/19/20 2012 (!) 86/42 -- -- 133 (!) 39 98 % --   11/19/20 2006 87/69 -- -- 135 (!) 41 98 % --   11/19/20 1957 (!) 104/44 -- -- 135 (!) 45 96 % --   11/19/20 1947 (!) 104/32 -- -- 137 (!) 31 98 % --   11/19/20 1943 105/62 -- -- 141 (!) 36 96 % --   11/19/20 1935 -- -- -- 147 (!) 35 96 % --   11/19/20 1932 (!) 102/36 -- -- 160 (!) 38 -- --   11/19/20 1931 -- 101.3 °F (38.5 °C) -- -- -- -- --   11/19/20 1926 -- -- -- -- -- -- 157 lb (71.2 kg)   11/19/20 1911 -- 101.8 °F (38.8 °C) Oral -- -- -- --   11/19/20 1909 -- -- -- 136 (!) 33 91 % --   11/19/20 1908 -- -- -- 135 (!) 36 95 % --   11/19/20 1907 115/60 -- -- 136 (!) 38 94 % --   11/19/20 1906 -- -- -- 136 (!) 31 91 % --   11/19/20 1905 -- -- -- 136 (!) 36 98 % --   11/19/20 1904 -- -- -- 135 (!) 32 98 % --   11/19/20 1903 -- -- -- 133 (!) 42 99 % --   11/19/20 1902 -- -- -- 134 (!) 39 100 % --   11/19/20 1901 (!) 72/27 101.8 °F (38.8 °C) Bladder 135 (!) 34 99 % --   11/19/20 1900 -- -- -- 136 (!) 37 100 % --   11/19/20 1859 -- -- -- 136 (!) 31 100 % --   11/19/20 1858 -- -- -- 135 24 97 % --   11/19/20 1857 (!) 64/26 -- -- 135 (!) 37 90 % --   11/19/20 1856 -- -- -- 135 30 99 % --   11/19/20 1855 (!) 69/29 -- -- 136 (!) 41 96 % --   11/19/20 1854 -- -- -- 135 (!) 37 100 % --   11/19/20 1853 -- -- -- 136 (!) 35 96 % --   11/19/20 1852 -- -- -- 135 (!) 34 95 % --   11/19/20 1851 (!) 63/21 -- -- 136 (!) 35 100 % --   11/19/20 1850 -- -- -- 135 (!) 34 100 % --   11/19/20 1849 (!) 61/25 -- -- 135 (!) 31 99 % --   11/19/20 1848 -- -- -- 136 (!) 33 -- --   11/19/20 1847 -- -- -- 136 (!) 35 98 % --   11/19/20 1846 -- -- -- 136 (!) 33 -- --   11/19/20 1845 -- -- -- 135 (!) 35 -- --   11/19/20 1844 -- -- -- 137 (!) 35 -- --   11/19/20 1843 -- -- -- 138 (!) 38 -- --   11/19/20 1842 -- -- -- 138 (!) 36 -- --   11/19/20 1841 -- -- -- 139 (!) 37 -- --   11/19/20 1840 -- -- -- 143 (!) 32 -- --   11/19/20 1839 -- -- -- 137 24 -- --   11/19/20 1838 -- -- -- 137 26 100 % --   11/19/20 1837 (!) 72/36 -- -- 137 23 (!) 89 % --   11/19/20 1836 -- -- -- 137 24 (!) 87 % --   11/19/20 1835 -- 101.5 °F (38.6 °C) Oral 137 (!) 33 97 % --   11/19/20 1834 (!) 83/56 -- -- 137 (!) 32 93 % --   11/19/20 1833 -- -- -- 138 (!) 33 97 % --       Oxygen Saturation Interpretation: Normal    ------------------------------------------ PROGRESS NOTES ------------------------------------------  Re-evaluation(s):  Time: 2210  Patients symptoms are worsening  Repeat physical examination is worsened    Counseling:  I have spoken with the Patient's son and discussed todays results, in addition to providing specific details for the plan of care and counseling regarding the diagnosis and prognosis. Their questions are answered at this time and they are agreeable with the plan of admission.    --------------------------------- ADDITIONAL PROVIDER NOTES ---------------------------------  Consultations:  Time: 2200. Spoke with Dr. Judi Alegre. Discussed case. They will admit the patient. This patient's ED course included: a personal history and physicial examination, re-evaluation prior to disposition, multiple bedside re-evaluations, IV medications, cardiac monitoring, continuous pulse oximetry and complex medical decision making and emergency management    This patient has remained hemodynamically stable during their ED course. Diagnosis:  1. Septic shock ([address]    2. Urinary tract infection with hematuria, site unspecified    3. Pneumonia due to organism        Disposition:  Patient's disposition: Admit to CCU/ICU  Patient's condition is critical.      Please note that the withdrawal or failure to initiate urgent interventions for this patient would likely result in a life threatening deterioration or permanent disability. Systems at risk for deterioration include: Cardiovascular, pulmonary    Accordingly this patient received 46 minutes of critical care time, excluding separately billable procedures.        Ed DO Naomi  Resident  11/19/20 [place] minimum assist (75% patients effort)

## 2021-08-18 ENCOUNTER — TRANSCRIPTION ENCOUNTER (OUTPATIENT)
Age: 86
End: 2021-08-18

## 2021-08-18 VITALS
SYSTOLIC BLOOD PRESSURE: 137 MMHG | HEART RATE: 81 BPM | DIASTOLIC BLOOD PRESSURE: 76 MMHG | OXYGEN SATURATION: 100 % | RESPIRATION RATE: 18 BRPM | TEMPERATURE: 98 F

## 2021-08-18 LAB
ANION GAP SERPL CALC-SCNC: 11 MMOL/L — SIGNIFICANT CHANGE UP (ref 5–17)
BUN SERPL-MCNC: 17 MG/DL — SIGNIFICANT CHANGE UP (ref 7–23)
CALCIUM SERPL-MCNC: 9.3 MG/DL — SIGNIFICANT CHANGE UP (ref 8.4–10.5)
CHLORIDE SERPL-SCNC: 105 MMOL/L — SIGNIFICANT CHANGE UP (ref 96–108)
CO2 SERPL-SCNC: 23 MMOL/L — SIGNIFICANT CHANGE UP (ref 22–31)
CREAT SERPL-MCNC: 0.83 MG/DL — SIGNIFICANT CHANGE UP (ref 0.5–1.3)
GLUCOSE SERPL-MCNC: 120 MG/DL — HIGH (ref 70–99)
HCT VFR BLD CALC: 40.2 % — SIGNIFICANT CHANGE UP (ref 34.5–45)
HGB BLD-MCNC: 12.9 G/DL — SIGNIFICANT CHANGE UP (ref 11.5–15.5)
MCHC RBC-ENTMCNC: 32.1 GM/DL — SIGNIFICANT CHANGE UP (ref 32–36)
MCHC RBC-ENTMCNC: 32.6 PG — SIGNIFICANT CHANGE UP (ref 27–34)
MCV RBC AUTO: 101.5 FL — HIGH (ref 80–100)
NRBC # BLD: 0 /100 WBCS — SIGNIFICANT CHANGE UP (ref 0–0)
PLATELET # BLD AUTO: 199 K/UL — SIGNIFICANT CHANGE UP (ref 150–400)
POTASSIUM SERPL-MCNC: 3.9 MMOL/L — SIGNIFICANT CHANGE UP (ref 3.5–5.3)
POTASSIUM SERPL-SCNC: 3.9 MMOL/L — SIGNIFICANT CHANGE UP (ref 3.5–5.3)
RBC # BLD: 3.96 M/UL — SIGNIFICANT CHANGE UP (ref 3.8–5.2)
RBC # FLD: 13.1 % — SIGNIFICANT CHANGE UP (ref 10.3–14.5)
SODIUM SERPL-SCNC: 139 MMOL/L — SIGNIFICANT CHANGE UP (ref 135–145)
WBC # BLD: 5.97 K/UL — SIGNIFICANT CHANGE UP (ref 3.8–10.5)
WBC # FLD AUTO: 5.97 K/UL — SIGNIFICANT CHANGE UP (ref 3.8–10.5)

## 2021-08-18 PROCEDURE — 96374 THER/PROPH/DIAG INJ IV PUSH: CPT

## 2021-08-18 PROCEDURE — G1004: CPT

## 2021-08-18 PROCEDURE — 90715 TDAP VACCINE 7 YRS/> IM: CPT

## 2021-08-18 PROCEDURE — 85027 COMPLETE CBC AUTOMATED: CPT

## 2021-08-18 PROCEDURE — U0003: CPT

## 2021-08-18 PROCEDURE — 80053 COMPREHEN METABOLIC PANEL: CPT

## 2021-08-18 PROCEDURE — 71045 X-RAY EXAM CHEST 1 VIEW: CPT

## 2021-08-18 PROCEDURE — 86900 BLOOD TYPING SEROLOGIC ABO: CPT

## 2021-08-18 PROCEDURE — 72170 X-RAY EXAM OF PELVIS: CPT

## 2021-08-18 PROCEDURE — 99291 CRITICAL CARE FIRST HOUR: CPT | Mod: 25

## 2021-08-18 PROCEDURE — 90471 IMMUNIZATION ADMIN: CPT

## 2021-08-18 PROCEDURE — 80048 BASIC METABOLIC PNL TOTAL CA: CPT

## 2021-08-18 PROCEDURE — 70450 CT HEAD/BRAIN W/O DYE: CPT

## 2021-08-18 PROCEDURE — 72125 CT NECK SPINE W/O DYE: CPT

## 2021-08-18 PROCEDURE — 86901 BLOOD TYPING SEROLOGIC RH(D): CPT

## 2021-08-18 PROCEDURE — 86850 RBC ANTIBODY SCREEN: CPT

## 2021-08-18 PROCEDURE — 86769 SARS-COV-2 COVID-19 ANTIBODY: CPT

## 2021-08-18 PROCEDURE — 85025 COMPLETE CBC W/AUTO DIFF WBC: CPT

## 2021-08-18 PROCEDURE — U0005: CPT

## 2021-08-18 PROCEDURE — 97162 PT EVAL MOD COMPLEX 30 MIN: CPT

## 2021-08-18 RX ORDER — LEVETIRACETAM 250 MG/1
1 TABLET, FILM COATED ORAL
Qty: 10 | Refills: 0
Start: 2021-08-18 | End: 2021-08-22

## 2021-08-18 RX ORDER — LEVETIRACETAM 250 MG/1
1 TABLET, FILM COATED ORAL
Qty: 14 | Refills: 0
Start: 2021-08-18 | End: 2021-08-24

## 2021-08-18 RX ADMIN — LEVETIRACETAM 500 MILLIGRAM(S): 250 TABLET, FILM COATED ORAL at 05:09

## 2021-08-18 NOTE — PROGRESS NOTE ADULT - ASSESSMENT
89 F with PMH CAD, HTN admitted with mechanical fall and small SDH admitted for observation and possibly Subacute Rehab   

## 2021-08-18 NOTE — PROGRESS NOTE ADULT - PROBLEM SELECTOR PLAN 3
will continue to monitor  no focal neuro deficits

## 2021-08-18 NOTE — DISCHARGE NOTE NURSING/CASE MANAGEMENT/SOCIAL WORK - NSDCPEFALRISK_GEN_ALL_CORE
For information on Fall & injury Prevention, visit [email or web address]

## 2021-08-18 NOTE — PROGRESS NOTE ADULT - PROBLEM SELECTOR PROBLEM 3
R/O CVA (cerebrovascular accident)

## 2021-08-18 NOTE — PROGRESS NOTE ADULT - PROBLEM SELECTOR PLAN 1
Neurosurgery help appreciated  repeat CT no change  no further intervention required at this time as per Neurosurgery
Neurosurgery help appreciated  repeat CT today
Neurosurgery help appreciated  repeat CT todya  restart aspirin and aggrenox only after Neurosurgery clears

## 2021-08-18 NOTE — DISCHARGE NOTE PROVIDER - NSDCMRMEDTOKEN_GEN_ALL_CORE_FT
Crestor 5 mg oral tablet: 1 tab(s) orally once a day  Cymbalta 30 mg oral delayed release capsule: 2 cap(s) orally once a day  Ecotrin Adult Low Strength 81 mg oral delayed release tablet: 1 tab(s) orally once a day  furosemide 20 mg oral tablet: 1 tab(s) orally once a day  gabapentin 300 mg oral capsule: 1 cap(s) orally 2 times a day  losartan 100 mg oral tablet: 1 tab(s) orally once a day  magnesium: Take 500 mg with meal twice daily   Myrbetriq 50 mg oral tablet, extended release: 1 tab(s) orally once a day    NOTE: Unable to verify with pharmacy. Patient sent picture of medication list from PCP computer  Prolia 60 mg/mL subcutaneous solution:   Vitamin B1 100 mg oral tablet: 1 tab(s) orally once a day  Voltaren Arthritis Pain 1% topical gel: 1 to 2 times per day  utd

## 2021-08-18 NOTE — DISCHARGE NOTE NURSING/CASE MANAGEMENT/SOCIAL WORK - NSDCVIVACCINE_GEN_ALL_CORE_FT
influenza, injectable, quadrivalent, preservative free; 06-Oct-2014 11:58; Marina Gibson (RN); [employer]; [id number]; IntraMuscular; Deltoid Left.; 0.5 milliLiter(s);   Tdap; 15-Aug-2021 12:14; Sachi Moyer (RN); [employer]; [id number] (Exp. Date: 01-Oct-2022); IntraMuscular; Deltoid Left.; 0.5 milliLiter(s); VIS (VIS Published: 09-May-2013, VIS Presented: 15-Aug-2021);

## 2021-08-18 NOTE — PROGRESS NOTE ADULT - SUBJECTIVE AND OBJECTIVE BOX
[facility]      Seen earlier today, and examined.  - Today, patient is without complaints.           *****MEDICATIONS: Current medication reviewed and documented.    MEDICATIONS  (STANDING):  lactated ringers. 1000 milliLiter(s) (50 mL/Hr) IV Continuous <Continuous>    MEDICATIONS  (PRN):          ***** VITAL SIGNS:  T(F): 97.9 (21 @ 11:49), Max: 98.2 (21 @ 16:02)  HR: 75 (21 @ 11:49) (75 - 88)  BP: 138/81 (21 @ 11:49) (137/75 - 158/79)  RR: 18 (21 @ 11:49) (18 - 18)  SpO2: 96% (21 @ 11:49) (93% - 99%)  Wt(kg): --  ,   I&O's Summary    17 Aug 2021 07:  -  18 Aug 2021 07:00  --------------------------------------------------------  IN: 480 mL / OUT: 600 mL / NET: -120 mL    18 Aug 2021 07:  -  18 Aug 2021 15:40  --------------------------------------------------------  IN: 480 mL / OUT: 0 mL / NET: 480 mL             *****PHYSICAL EXAM:   alert oriented x 3 attention comprehension are fair.  Able to name, repeat.   EOmi fundi not visualized   no nystagmus VFF to confrontation  Tongue is midline  Palate elevates symmetrically   Moving all 4 ext spontaneously no drift appreciated    Gait not assessed.            *****LAB AND IMAGIN.9   5.97  )-----------( 199      ( 18 Aug 2021 11:14 )             40.2                   139  |  105  |  17  ----------------------------<  120<H>  3.9   |  23  |  0.83    Ca    9.3      18 Aug 2021 11:14                           [All pertinent recent Imaging/Reports reviewed]           *****A S S E S S M E N T   A N D   P L A N :  Excerpt from H&P,"     89 F with pmhx cad, htn s/op fall today.   Patient states that she walks with walker, tripped and fell, no loss of conscious ness. Head injury present.               Problem/Recommendations 1:  sdh falcine   unchanged  denies headache   continue current management   dc keppra   ice to head for pain with soft tissue swelling from fall   need constant supervision   Problem/Recommendations 2:htn elevated   bp goal < 140         Thank you for allowing me to participate in the care of this patient. Will continue to follow patient periodically. Please do not hesitate to call me if you have any  questions or if there has been a change in patients neurological status     ________________  Marina Thayer MD  [facility] ([facility]  [phone]      33 minutes spent on total encounter; more than 50 % of the visit was  spent counseling about plan of care, compliance to diet/exercise and medication regimen and or  coordinating care by the attending physician.      It is advised that stroke patients follow up with JJ Mccormick @ [phone] in 1- 2 weeks.   Others please follow up with Dr. Michael Nissenbaum [phone]
Patient is a 89y old  Female who presents with a chief complaint of s/p fall today (15 Aug 2021 19:49)  patient not known to me, assigned this AM to assume care  chart reviewed and events thus far noted  admitted overnight by hospitalist colleague    DATE OF SERVICE: 08-16-21 @ 13:14    SUBJECTIVE / OVERNIGHT EVENTS: overnight events noted    ROS:  Resp: No cough no sputum production  CVS: No chest pain no palpitations no orthopnea  GI: no N/V/D  : no dysuria, no hematuria  Neuro: no weakness no paresthesias        MEDICATIONS  (STANDING):  lactated ringers. 1000 milliLiter(s) (50 mL/Hr) IV Continuous <Continuous>  levETIRAcetam 500 milliGRAM(s) Oral two times a day    MEDICATIONS  (PRN):        CAPILLARY BLOOD GLUCOSE        I&O's Summary    15 Aug 2021 07:01  -  16 Aug 2021 07:00  --------------------------------------------------------  IN: 400 mL / OUT: 150 mL / NET: 250 mL        Vital Signs Last 24 Hrs  T(C): 36.5 (16 Aug 2021 07:50), Max: 37 (15 Aug 2021 19:49)  T(F): 97.7 (16 Aug 2021 07:50), Max: 98.6 (15 Aug 2021 19:49)  HR: 73 (16 Aug 2021 07:50) (73 - 92)  BP: 143/67 (16 Aug 2021 07:50) (122/62 - 189/90)  BP(mean): 85 (15 Aug 2021 19:49) (85 - 85)  RR: 18 (16 Aug 2021 07:50) (18 - 20)  SpO2: 96% (16 Aug 2021 07:50) (94% - 100%)    PHYSICAL EXAM:  GENERAL: in no apparent distress  HEAD:  Atraumatic, Normocephalic  EYES: EOMI, PERRLA, sclera clear  NECK: Supple, No JVD  CHEST/LUNG: clear   HEART: S1 S2; soft ejection systolic murmur +   ABDOMEN: Soft, Nontender  EXTREMITIES:   no edema  NEUROLOGY: AO x 3 non-focal  SKIN: No rashes or lesions    LABS:                        15.8   9.59  )-----------( 188      ( 15 Aug 2021 13:37 )             49.0     08-15    142  |  104  |  18  ----------------------------<  114<H>  4.1   |  28  |  0.89    Ca    9.2      15 Aug 2021 16:41    TPro  7.1  /  Alb  3.9  /  TBili  0.4  /  DBili  x   /  AST  43<H>  /  ALT  30  /  AlkPhos  82  08-15                All consultant(s) notes reviewed and care discussed with other providers        Contact Number, Dr Marin [phone]
Patient is a 89y old  Female who presents with a chief complaint of s/p fall today (17 Aug 2021 13:03)      DATE OF SERVICE: 08-18-21 @ 14:56    SUBJECTIVE / OVERNIGHT EVENTS: overnight events noted    ROS:  Resp: No cough no sputum production  CVS: No chest pain no palpitations no orthopnea  GI: no N/V/D  : no dysuria, no hematuria  Neuro: no weakness no paresthesias        MEDICATIONS  (STANDING):  lactated ringers. 1000 milliLiter(s) (50 mL/Hr) IV Continuous <Continuous>  levETIRAcetam 500 milliGRAM(s) Oral two times a day    MEDICATIONS  (PRN):        CAPILLARY BLOOD GLUCOSE        I&O's Summary    17 Aug 2021 07:01  -  18 Aug 2021 07:00  --------------------------------------------------------  IN: 480 mL / OUT: 600 mL / NET: -120 mL    18 Aug 2021 07:01  -  18 Aug 2021 14:56  --------------------------------------------------------  IN: 480 mL / OUT: 0 mL / NET: 480 mL        Vital Signs Last 24 Hrs  T(C): 36.6 (18 Aug 2021 11:49), Max: 36.8 (17 Aug 2021 16:02)  T(F): 97.9 (18 Aug 2021 11:49), Max: 98.2 (17 Aug 2021 16:02)  HR: 75 (18 Aug 2021 11:49) (75 - 88)  BP: 138/81 (18 Aug 2021 11:49) (137/75 - 158/79)  BP(mean): --  RR: 18 (18 Aug 2021 11:49) (18 - 18)  SpO2: 96% (18 Aug 2021 11:49) (93% - 99%)    PHYSICAL EXAM:  EYES: EOMI, PERRLA  NECK: Supple, No JVD  CHEST/LUNG: clear   HEART: S1 S2; soft systolic murmur +   ABDOMEN: Soft, Nontender  EXTREMITIES:   no edema  NEUROLOGY: Alert  non-focal  SKIN: No rashes or lesions    LABS:                        12.9   5.97  )-----------( 199      ( 18 Aug 2021 11:14 )             40.2     08-18    139  |  105  |  17  ----------------------------<  120<H>  3.9   |  23  |  0.83    Ca    9.3      18 Aug 2021 11:14                  All consultant(s) notes reviewed and care discussed with other providers        Contact Number, Dr Marin [phone]
Patient is a 89y old  Female who presents with a chief complaint of s/p fall today (16 Aug 2021 13:13)      DATE OF SERVICE: 08-17-21 @ 13:04    SUBJECTIVE / OVERNIGHT EVENTS: overnight events noted    ROS:  Resp: No cough no sputum production  CVS: No chest pain no palpitations no orthopnea  GI: no N/V/D  : no dysuria, no hematuria  Neuro: no weakness no paresthesias          MEDICATIONS  (STANDING):  lactated ringers. 1000 milliLiter(s) (50 mL/Hr) IV Continuous <Continuous>  levETIRAcetam 500 milliGRAM(s) Oral two times a day    MEDICATIONS  (PRN):        CAPILLARY BLOOD GLUCOSE        I&O's Summary    16 Aug 2021 07:01  -  17 Aug 2021 07:00  --------------------------------------------------------  IN: 240 mL / OUT: 600 mL / NET: -360 mL        Vital Signs Last 24 Hrs  T(C): 36.5 (17 Aug 2021 11:27), Max: 36.7 (16 Aug 2021 23:23)  T(F): 97.7 (17 Aug 2021 11:27), Max: 98.1 (16 Aug 2021 23:23)  HR: 84 (17 Aug 2021 11:34) (71 - 86)  BP: 132/81 (17 Aug 2021 11:34) (126/69 - 171/70)  BP(mean): --  RR: 18 (17 Aug 2021 11:34) (17 - 18)  SpO2: 95% (17 Aug 2021 11:34) (93% - 96%)    PHYSICAL EXAM:  EYES: EOMI, PERRLA  NECK: Supple, No JVD  CHEST/LUNG: clear   HEART: S1 S2; soft systolic murmur +   ABDOMEN: Soft, Nontender  EXTREMITIES:   no edema  NEUROLOGY: Alert  non-focal  SKIN: No rashes or lesions    LABS:                        15.8   9.59  )-----------( 188      ( 15 Aug 2021 13:37 )             49.0     08-15    142  |  104  |  18  ----------------------------<  114<H>  4.1   |  28  |  0.89    Ca    9.2      15 Aug 2021 16:41    TPro  7.1  /  Alb  3.9  /  TBili  0.4  /  DBili  x   /  AST  43<H>  /  ALT  30  /  AlkPhos  82  08-15                All consultant(s) notes reviewed and care discussed with other providers        Contact Number, Dr Marin [phone]

## 2021-08-18 NOTE — PROGRESS NOTE ADULT - PROBLEM SELECTOR PLAN 2
acceptable  continue home meds with hold parameters

## 2021-08-18 NOTE — DISCHARGE NOTE PROVIDER - NSDCCPCAREPLAN_GEN_ALL_CORE_FT
PRINCIPAL DISCHARGE DIAGNOSIS  Diagnosis: SDH (subdural hematoma)  Assessment and Plan of Treatment: You were seen by Neurosurgery. You have a subdural hematoma. Repeat CT showed no change so this is stable. There is no further need for surgical intervention at this time. If you have any headache, blurred vision, dizziness, or other sensations please contact your doctor. You will take Keppra until August 23. You were also seen by a neurologist here in the hospital. You should make an appointment in the next two weeks for follow up. You are discharged to rehab      SECONDARY DISCHARGE DIAGNOSES  Diagnosis: HTN (hypertension)  Assessment and Plan of Treatment: Low sodium and fat diet, continue anti-hypertensive medications, and follow up with primary care physician.

## 2021-08-18 NOTE — DISCHARGE NOTE PROVIDER - PROVIDER TOKENS
PROVIDER:[TOKEN:[[id number]],FOLLOWUP:[1 week]],PROVIDER:[TOKEN:[[id number]],FOLLOWUP:[Routine]]

## 2021-08-18 NOTE — DISCHARGE NOTE PROVIDER - HOSPITAL COURSE
89 F with PMH CAD, HTN admitted with mechanical fall and small SDH admitted for observation and possibly Subacute Rehab         Problem: SDH (subdural hematoma).  Plan: Neurosurgery help appreciated, repeat CT no change, no further intervention required at this time as per Neurosurgery.      R/O Benign essential HTN.  Plan: acceptable, continue home meds with hold parameters.      R/O CVA (cerebrovascular accident).  Plan: will continue to monitor no focal neuro deficits.     Case discussed with Dr Marin. Plan was discussed with patient. All questions were answered. Pt will be discharged back to rehab

## 2021-08-18 NOTE — DISCHARGE NOTE NURSING/CASE MANAGEMENT/SOCIAL WORK - PATIENT PORTAL LINK FT
You can access the FollowMyHealth Patient Portal offered by [facility] by registering at the following website: [email or web address]. By joining [facility] [facility]’s FollowMyHealth portal, you will also be able to view your health information using other applications (apps) compatible with our system.

## 2021-08-18 NOTE — DISCHARGE NOTE PROVIDER - CARE PROVIDER_API CALL
Problem: DISCHARGE PLANNING - CARE MANAGEMENT  Goal: Discharge to post-acute care or home with appropriate resources  INTERVENTIONS:  - Conduct assessment to determine patient/family and health care team treatment goals, and need for post-acute services based on payer coverage, community resources, and patient preferences, and barriers to discharge  - Address psychosocial, clinical, and financial barriers to discharge as identified in assessment in conjunction with the patient/family and health care team  - Arrange appropriate level of post-acute services according to patient's   needs and preference and payer coverage in collaboration with the physician and health care team  - Communicate with and update the patient/family, physician, and health care team regarding progress on the discharge plan  - Arrange appropriate transportation to post-acute venues    Outcome: Progressing Marina Tahyer  NEUROLOGY  [address] [place] [address]  Phone: [phone]  Fax: [phone]  Follow Up Time: 1 week    Jose F Campo  INTERNAL MEDICINE  [address] [place] [address] [address]  [place] [address]  Phone: [phone]  Fax: [phone]  Follow Up Time: Routine

## 2021-09-14 ENCOUNTER — NON-APPOINTMENT (OUTPATIENT)
Age: 86
End: 2021-09-14

## 2021-09-23 ENCOUNTER — APPOINTMENT (OUTPATIENT)
Dept: CT IMAGING | Facility: CLINIC | Age: 86
End: 2021-09-23
Payer: MEDICARE

## 2021-09-23 ENCOUNTER — OUTPATIENT (OUTPATIENT)
Dept: OUTPATIENT SERVICES | Facility: HOSPITAL | Age: 86
LOS: 1 days | End: 2021-09-23
Payer: MEDICARE

## 2021-09-23 ENCOUNTER — NON-APPOINTMENT (OUTPATIENT)
Age: 86
End: 2021-09-23

## 2021-09-23 ENCOUNTER — APPOINTMENT (OUTPATIENT)
Dept: NEUROLOGY | Facility: CLINIC | Age: 86
End: 2021-09-23
Payer: MEDICARE

## 2021-09-23 VITALS
HEART RATE: 86 BPM | BODY MASS INDEX: 28.97 KG/M2 | SYSTOLIC BLOOD PRESSURE: 140 MMHG | HEIGHT: 58 IN | DIASTOLIC BLOOD PRESSURE: 72 MMHG | WEIGHT: 138 LBS

## 2021-09-23 DIAGNOSIS — Z96.652 PRESENCE OF LEFT ARTIFICIAL KNEE JOINT: Chronic | ICD-10-CM

## 2021-09-23 DIAGNOSIS — S06.5X9A TRAUMATIC SUBDURAL HEMORRHAGE WITH LOSS OF CONSCIOUSNESS OF UNSPECIFIED DURATION, INITIAL ENCOUNTER: ICD-10-CM

## 2021-09-23 PROCEDURE — 70450 CT HEAD/BRAIN W/O DYE: CPT

## 2021-09-23 PROCEDURE — 70450 CT HEAD/BRAIN W/O DYE: CPT | Mod: 26,MH

## 2021-09-23 PROCEDURE — 99213 OFFICE O/P EST LOW 20 MIN: CPT

## 2021-09-23 NOTE — PHYSICAL EXAM
[FreeTextEntry1] : Constitutional:  Patient was elderly and frail but in no acute distress. \par \par Head:  Normocephalic, atraumatic. Tympanic membranes were not visualized. \par \par Neck:  Supple with full range of motion.  There was mild upper thoracic tenderness.\par \par Cardiovascular:  Cardiac rhythm was regular without murmur. There were no carotid bruits.  There was bilateral pretibial and ankle edema with absent pulses at the knees and ankles.  \par \par Respiratory:  Lungs were clear. \par \par Abdomen:  Soft and nontender. \par \par Spine: There was mild lumbar tenderness.\par \par Skin:  There were no rashes. \par \par NEUROLOGICAL EXAMINATION:\par \par Mental Status: Patient was alert and oriented. Speech was fluent. There was no dysarthria.  She scored 25 out of 30 on MMSE making 3 errors in recall.\par \par Cranial Nerves: \par \par II: She could finger count bilaterally.  Pupils were surgical but reactive. Visual fields were full. Funduscopic examination was normal. \par \par III, IV, VI:  Eye movements were full without nystagmus. \par \par V: Facial sensation was intact. \par \par VII: Facial strength was normal. \par \par VIII: Hearing was diminished bilaterally. \par \par IX, X: Palatal movement was normal. Phonation was normal. \par \par XI: Sternocleidomastoids and trapezii were normal. \par \par XII: Tongue was midline and movements normal. There was no lingual atrophy or fasciculations. \par \par Motor Examination: Muscle bulk, tone and strength were normal except for difficulty abducting her toes. \par \par Sensory Examination: Vibration sense was diminished in the feet.  Joint position sense was intact.  There was shading of pinprick to the knees and palms.\par \par Reflexes: DTRs were trace at the biceps and absent elsewhere.\par \par Plantar Responses: Plantar responses were flexor. \par \par Coordination/Cerebellar Function: There was no dysmetria on finger to nose or heel to shin testing. \par \par Gait/Stance: Posture was stooped.  Strides were short and gait very unsteady.\par

## 2021-09-23 NOTE — CONSULT LETTER
[Dear  ___] : Dear  [unfilled], [Consult Letter:] : I had the pleasure of evaluating your patient, [unfilled]. [Please see my note below.] : Please see my note below. [Consult Closing:] : Thank you very much for allowing me to participate in the care of this patient.  If you have any questions, please do not hesitate to contact me. [Sincerely,] : Sincerely, [DrJp  ___] : Dr. NIETO [FreeTextEntry3] : Jc Fong MD\par

## 2021-09-23 NOTE — ASSESSMENT
[FreeTextEntry1] : Mrs. 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  She suffered a fall a few weeks ago sustaining a interhemispheric subdural hematoma.  She appears to have recovered.  For completeness, I will arrange a follow-up CT of the brain.\par \par I suspect that her severe claudication is due to a combination of lumbar stenosis and PAD.  She will consider pursuing a vascular surgical evaluation.  I suspect that her postural dizziness is due to vestibular insufficiency.  She is not orthostatic.  If that symptom remains a problem, neurotology consultation and vestibular therapy can be pursued.\par \par I suggested a routine follow-up visit in 6 months.  Telephone contact will be maintained in the meantime.

## 2021-10-05 ENCOUNTER — APPOINTMENT (OUTPATIENT)
Dept: NEUROLOGY | Facility: CLINIC | Age: 86
End: 2021-10-05

## 2022-03-28 ENCOUNTER — APPOINTMENT (OUTPATIENT)
Dept: NEUROLOGY | Facility: CLINIC | Age: 87
End: 2022-03-28
Payer: MEDICARE

## 2022-03-28 VITALS
HEIGHT: 58 IN | SYSTOLIC BLOOD PRESSURE: 143 MMHG | HEART RATE: 81 BPM | WEIGHT: 142 LBS | BODY MASS INDEX: 29.81 KG/M2 | DIASTOLIC BLOOD PRESSURE: 69 MMHG

## 2022-03-28 DIAGNOSIS — S06.5X9A TRAUMATIC SUBDURAL HEMORRHAGE WITH LOSS OF CONSCIOUSNESS OF UNSPECIFIED DURATION, INITIAL ENCOUNTER: ICD-10-CM

## 2022-03-28 DIAGNOSIS — I73.9 PERIPHERAL VASCULAR DISEASE, UNSPECIFIED: ICD-10-CM

## 2022-03-28 DIAGNOSIS — G62.9 POLYNEUROPATHY, UNSPECIFIED: ICD-10-CM

## 2022-03-28 PROCEDURE — 99214 OFFICE O/P EST MOD 30 MIN: CPT

## 2022-03-28 NOTE — REVIEW OF SYSTEMS
[Feeling Tired] : feeling tired [Memory Lapses or Loss] : memory loss [Difficulty Walking] : difficulty walking [Incontinence] : incontinence [Negative] : Heme/Lymph

## 2022-03-28 NOTE — ASSESSMENT
[FreeTextEntry1] : Mrs. 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  She suffered a recent small interhemispheric subdural hematoma which resolved.  Her vascular claudication symptoms are stable.\par \par At this time, no other neurologic intervention appears indicated at this time.  She should be monitored for development of any toxicity to gabapentin.  She will be reevaluated in 6 months.  Telephone contact will be maintained in the meantime.

## 2022-03-28 NOTE — HISTORY OF PRESENT ILLNESS
[FreeTextEntry1] : Mrs. Felicitas Ny returned to the office having been last seen on September 23, 2021.  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  She was accompanied to the office by her daughter Rebecca.\par \par She had sustained a fall interhemispheric subdural during a fall in August.  A follow-up CT of the brain revealed complete resolution.  She was evaluated by a vascular surgeon because of vascular claudication.  He suggested conservative measures.\par \par Mr. Ny has been hospitalized at [facility] for 2 weeks with pulmonary issues.  He is quite ill.  They are both residing at the [facility] but now in the assisted living section.\par \par According to Rebecca, she is stable.  She is not experiencing any major headaches.  Her major complaint is left knee pain. She is sleeping well.  She has persistent difficulties controlling her bowel and bladder.\par \par Medications include gabapentin 300 mg 4 times a day, losartan 100 mg daily, rosuvastatin 5 mg daily, duloxetine 60 mg daily, spironolactone, Boniva, aspirin 81 mg daily, vitamin C, thiamine 100 mg daily, Myrbetriq 50 mg daily, magnesium and furosemide 20 mg daily.

## 2022-03-28 NOTE — PHYSICAL EXAM
[FreeTextEntry1] : Constitutional:  Patient was elderly and frail but in no acute distress. \par \par Head:  Normocephalic, atraumatic. Tympanic membranes were not visualized. \par \par Neck:  Supple with full range of motion.  There was mild upper thoracic tenderness.\par \par Cardiovascular:  Cardiac rhythm was regular without murmur. There were no carotid bruits.  There was bilateral pretibial and ankle edema with absent pulses at the knees and ankles.  \par \par Respiratory:  Lungs were clear. \par \par Abdomen:  Soft and nontender. \par \par Spine: There was mild lumbar tenderness.\par \par Skin:  There were no rashes. \par \par NEUROLOGICAL EXAMINATION:\par \par Mental Status: Patient was alert and oriented. Speech was fluent. There was no dysarthria.  She scored 23 out of 30 on MMSE, a two-point decline from her last visit.  She made 2 errors when attempting to spell world backwards and recalled none of 3 words.  She may 2 simple errors in orientation.\par \par Cranial Nerves: \par \par II: She could finger count bilaterally.  Pupils were surgical but reactive. Visual fields were full.  Fundi were not visualized.\par \par III, IV, VI:  Eye movements were full without nystagmus. \par \par V: Facial sensation was intact. \par \par VII: Facial strength was normal. \par \par VIII: Hearing was diminished bilaterally. \par \par IX, X: Palatal movement was normal. Phonation was normal. \par \par XI: Sternocleidomastoids and trapezii were normal. \par \par XII: Tongue was midline and movements normal. There was no lingual atrophy or fasciculations. \par \par Motor Examination: Muscle bulk, tone and strength were normal except for difficulty abducting her toes. \par \par Sensory Examination: Vibration sense was diminished in the feet.  Joint position sense was intact.  There was shading of pinprick to the knees and palms.\par \par Reflexes: DTRs were trace at the biceps and absent elsewhere.\par \par Plantar Responses: Plantar responses were flexor. \par \par Coordination/Cerebellar Function: There was no dysmetria on finger to nose or heel to shin testing. \par \par Gait/Stance: Posture was stooped.  Strides were short and gait very unsteady.\par

## 2023-01-03 NOTE — PATIENT PROFILE ADULT - TYPE OF COMMUNICATION USED TO ADDRESS HEALTHCARE
Spoke to pt on phone.  Pt is scheduled on 1/13/2023 for IR procedure with anes(pt has sleep apnea)  Preop instructions given, pt instructed to stop Xarelto for 48hrs)   pt verbally understood. Pt aware and confirmed, Thanks   
Other

## 2023-01-31 ENCOUNTER — NON-APPOINTMENT (OUTPATIENT)
Age: 88
End: 2023-01-31

## 2023-05-05 NOTE — DISCHARGE NOTE NURSING/CASE MANAGEMENT/SOCIAL WORK - NSCORESITESY/N_GEN_A_CORE_RD
TB screening: Unable to perform due to computer technicalities, did offer TB spot however patient declined and would rather get TB skin test at health center  Anxiety: Discussed how changing Effexor may cause issues with anxiety control, opt to add low-dose Wellbutrin to augment depression symptoms.  Did discuss with patient that Wellbutrin does not help with anxiety and using Wellbutrin alone could cause worsening symptoms    Get screening blood work  
No

## 2024-02-28 NOTE — DISCHARGE NOTE PROVIDER - NSDCCPGOAL_GEN_ALL_CORE_FT
-- DO NOT REPLY / DO NOT REPLY ALL --  -- Message is from Engagement Center Operations (ECO) --    ONLY TO BE USED WITHIN A REFILL MEDICATION ENCOUNTER    Med Refill  Is the patient currently having any symptoms?: No/Non-Emergent symptoms    Name of medication requested: See pended med    Is this the first request for the medication in the last 48 hours?: Yes    Patient is requesting a medication refill - medication is on active medication list    Patient is currently OUT of the requested medication - sent as HIGH priority    Full name of the provider who ordered the medication: [facility] site name / Account # for provider: [facility]     Preferred Pharmacy: Pharmacy  [id number] - [address]    Patient confirmed the above pharmacy as correct?  Yes    Caller Information         Type Contact Phone/Fax    02/28/2024 11:54 AM CST Phone (Incoming) Kathy Cade (Self) [phone] (H)            Alternative phone number: please send 90 day refills - cost effective for patient     Can a detailed message be left?: Yes        
Pending Appointment: 04/10/24 with Karthikeyan Gibbs MD    Medication refill requested for amLODIPine (NORVASC) 10 MG tablet   Last Refill/Prescribed: Date 01/22/24, # of tablets: 30, # of refills approved: 0     Medication: amLODIPine (NORVASC) 10 MG tablet  passed protocol.   Last office visit date: 01/17/24  Next appointment scheduled?: Yes   Number of refills given: 0    Unable to send rx to pharmacy due to e-scribe issues with [place], [facility] & [facility].     Called and spoke with patient. Advised of current e-scribe issues and patient stated she would like a paper script. She would like to come to office to  so she can take it to [place] [facility].    She would also like a call back to confirm is this can be done. Forwarding to provider.   
To get better and follow your care plan as instructed.

## 2025-06-21 NOTE — HISTORY OF PRESENT ILLNESS
[FreeTextEntry1] : Mrs. Felicitas Ny returned to the office having been last seen on July 12, 2021.  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  I suggested vascular surgical consultation.\par \par Last month, she suffered a fall sustaining a small interhemispheric subdural hematoma.  She was hospitalized at [facility].  She was then transferred to rehabilitation at the [facility].  She resides with her  in the independent living action of the [facility].  She anticipates returning home in the next day or so.\par \par She reports occasional postural lightheadedness without vertigo.  She is hard of hearing.  She denies headaches.  She has mild memory problems.  She is able to feed herself and ambulate with a walker.  She has poor bladder control.\par \par Medications include gabapentin 300 mg twice a day, losartan 100 mg daily, rosuvastatin 5 mg daily, duloxetine 60 mg daily, aspirin 81 mg daily, vitamin C, thiamine 100 mg daily, Myrbetriq 50 mg daily, magnesium and furosemide 20 mg daily.
corrected